# Patient Record
Sex: MALE | Race: WHITE | NOT HISPANIC OR LATINO | Employment: FULL TIME | URBAN - METROPOLITAN AREA
[De-identification: names, ages, dates, MRNs, and addresses within clinical notes are randomized per-mention and may not be internally consistent; named-entity substitution may affect disease eponyms.]

---

## 2023-12-28 ENCOUNTER — TELEPHONE (OUTPATIENT)
Dept: PRIMARY CARE | Facility: CLINIC | Age: 63
End: 2023-12-28
Payer: COMMERCIAL

## 2023-12-28 NOTE — TELEPHONE ENCOUNTER
Iris  [facility]  [phone]    Need a verbal for Dr to follow   Plan of care orders

## 2024-01-03 ENCOUNTER — OFFICE VISIT (OUTPATIENT)
Dept: PRIMARY CARE | Facility: CLINIC | Age: 64
End: 2024-01-03
Payer: COMMERCIAL

## 2024-01-03 VITALS
DIASTOLIC BLOOD PRESSURE: 72 MMHG | HEIGHT: 66 IN | OXYGEN SATURATION: 96 % | SYSTOLIC BLOOD PRESSURE: 118 MMHG | HEART RATE: 112 BPM | BODY MASS INDEX: 22.92 KG/M2 | WEIGHT: 142.6 LBS

## 2024-01-03 DIAGNOSIS — E78.2 MIXED HYPERLIPIDEMIA: ICD-10-CM

## 2024-01-03 DIAGNOSIS — F10.11 HISTORY OF ETOH ABUSE: ICD-10-CM

## 2024-01-03 DIAGNOSIS — Z76.89 ENCOUNTER FOR SUPPORT AND COORDINATION OF TRANSITION OF CARE: Primary | ICD-10-CM

## 2024-01-03 DIAGNOSIS — I10 PRIMARY HYPERTENSION: ICD-10-CM

## 2024-01-03 DIAGNOSIS — J44.1 CHRONIC OBSTRUCTIVE PULMONARY DISEASE WITH ACUTE EXACERBATION (MULTI): ICD-10-CM

## 2024-01-03 PROBLEM — F17.200 CURRENT EVERY DAY SMOKER: Status: ACTIVE | Noted: 2024-01-03

## 2024-01-03 PROCEDURE — 3074F SYST BP LT 130 MM HG: CPT | Performed by: EMERGENCY MEDICINE

## 2024-01-03 PROCEDURE — 3078F DIAST BP <80 MM HG: CPT | Performed by: EMERGENCY MEDICINE

## 2024-01-03 PROCEDURE — 99496 TRANSJ CARE MGMT HIGH F2F 7D: CPT | Performed by: EMERGENCY MEDICINE

## 2024-01-03 NOTE — PROGRESS NOTES
Subjective   Patient ID: Cuco Arshad is a 63 y.o. male who presents for Hospital Follow-up.    Assessment/Plan   Problem List Items Addressed This Visit       COPD (chronic obstructive pulmonary disease) (CMS/Formerly [facility])    History of ETOH abuse    Hyperlipidemia    Hypertension     Other Visit Diagnoses       Encounter for support and coordination of transition of care    -  Primary          COPD- continue spiriva and albuterol prn. Counseled to follow up with pulmonary.     CAD/PVD- continue atorvastatin    Hypertension- stable, continue lisinopril 10mg     Anxiety and depression- doing well on sertraline 100mg     Follow up in 3 months or sooner as needed     Source of history: Nurse, Medical personnel, Medical record, Patient.  History limitation: None.    HPI  63 y.o. male here for transition of care visit     Patient was discharged from [facility] on 12/29/2023 and there was interactive contact by patient/family or facility staff on behalf of patient with me/office within 2 working days regarding patient's transition    Patient was recently admitted to the hospital.  Patient's history regarding the reason for hospital admission and the course in the hospital was reviewed with patinet and/or family.  Patient's medical records including lab work, radiology and other medical notes were reviewed.  His medication list prior to admission and discharge medication list are compared and updated.    Patient encounter was done face-to-face    Patient is unable to give detailed history and therefore history is obtained from the chart    History from hospitalization-   Admitted for COPD exacerbation, acute hypoxic respiratory failure initially requiring intubation (extubated December 20), and ileus (resolved without intervention). History of alcohol abuse and chronic smoking. Additional problems include hypertension, CAD, and PVD. Pulmonary and ID consulted. Patient stabilized and discharged on steroids. Will  "get outpatient PFTs.     Since discharge reports has been breathing well. Using spiriva and albuterol as needed.      No Known Allergies    No current outpatient medications on file.     No current facility-administered medications for this visit.       Objective   Visit Vitals  /72   Pulse (!) 112   Ht 1.676 m (5' 6\")   Wt 64.7 kg (142 lb 9.6 oz)   SpO2 96%   BMI 23.02 kg/m²   BSA 1.74 m²     Physical Exam  Vital signs as per nursing/MA documentation   General appearance: Alert and in no acute distress  HEENT: Normal Inspection   Neck: Normal Inspection   Respiratory: No respiratory distress Lungs are clear   Cardiovascular: Heart rate normal. No gallop  Back: Normal Inspection   Skin inspection: Warm   Musculoskeletal: No deformities   Neuro: Limited exam. Baseline    Review of Systems  Comprehensive review of systems as allowed by patient condition and nursing input is negative    No visits with results within 4 Month(s) from this visit.   Latest known visit with results is:   No results found for any previous visit.       Radiology: Reviewed imaging in powerchart.  No results found.    No family history on file.  Social History     Socioeconomic History    Marital status:      Spouse name: Not on file    Number of children: Not on file    Years of education: Not on file    Highest education level: Not on file   Occupational History    Not on file   Tobacco Use    Smoking status: Not on file    Smokeless tobacco: Not on file   Substance and Sexual Activity    Alcohol use: Not on file    Drug use: Not on file    Sexual activity: Not on file   Other Topics Concern    Not on file   Social History Narrative    Not on file     Social Determinants of Health     Financial Resource Strain: Not on file   Food Insecurity: Not on file   Transportation Needs: Not on file   Physical Activity: Not on file   Stress: Not on file   Social Connections: Not on file   Intimate Partner Violence: Not on file   Housing " Stability: Not on file     No past medical history on file.  No past surgical history on file.    Scribe Attestation  By signing my name below, I, Mane Meléndez   attest that this documentation has been prepared under the direction and in the presence of Josias Ramirez MD.

## 2025-03-17 ENCOUNTER — NURSING HOME VISIT (OUTPATIENT)
Dept: POST ACUTE CARE | Facility: EXTERNAL LOCATION | Age: 65
End: 2025-03-17
Payer: MEDICAID

## 2025-03-17 DIAGNOSIS — J44.1 COPD EXACERBATION (MULTI): ICD-10-CM

## 2025-03-17 DIAGNOSIS — J96.01 ACUTE HYPOXIC RESPIRATORY FAILURE: Primary | ICD-10-CM

## 2025-03-17 DIAGNOSIS — E78.2 MIXED HYPERLIPIDEMIA: ICD-10-CM

## 2025-03-17 PROCEDURE — 99305 1ST NF CARE MODERATE MDM 35: CPT | Performed by: STUDENT IN AN ORGANIZED HEALTH CARE EDUCATION/TRAINING PROGRAM

## 2025-06-20 NOTE — PROGRESS NOTES
Date of Service: 3/17/25      HPI/Subjective  64M with PMH of COPD, MDD, HLD was admitted to OSH for COPD exacerbation requiring ICU admission for BIPAP. Once medically stabilized he is discharged to [facility] for skilled rehab. Currently requiring 4LNCO2 (new since hospital). Denies any new/worsening SOB/HELM. No fevers, chills, CP reported. Tolerating therapy POC appropriately. POC discussed/concerns addressed.     Denies fevers, chills, weight loss, lightheadedness, dizziness, vision changes, sore throat, runny nose, CP, palpitations, n/v/d, abd pain, black/bloody stools, mood disturbance, or new numbness/weakness/tingling in arms/legs/face.        Other Medical, Surgical, Family, Social Hx, Allergies per chart in PCC.   Medication list reviewed. Please see PCC.     Objective:   Physical Exam     Vital signs reviewed. Please see chart in PCC.     General: NAD. NCAT. Alert, awake  HEENT: PERRLA. EOMI. MMM. Nares patent bl.  Cardiovascular: RRR. S1/S2 wnl.   Respiratory: BL scattered rhonchi. No acute respiratory distress on 4LNCO2  GI: Soft, NT abdomen. BS present x 4.   MSK: Generalized weakness   Extremities: No major edema.   Skin: No visible rashes or bruises.   Neuro: Cranial Nerves grossly intact. Motor/sensory wnl.   Psych: Mood wnl.          REVIEW OF SYSTEMS   ROS reviewed within HPI and is otherwise negative       Assessment and Plan  Encounter Diagnoses   Name Primary?    Acute hypoxic respiratory failure (Multi) Yes    COPD exacerbation (Multi)     Mixed hyperlipidemia        -Continue supplemental O2 to maintain SpO2 > 88%. Wean as tolerated  -Finish course of prednisone  -Continue incruse ellipta and symbicort  -Monitor for recurrent SOB/HELM.   -Continue statin.   -Pre-Admission hospital notes reviewed  -Pertinent radiology, if any, reviewed   -Current rehab plan reviewed; continue pending any major changes  -Current medication therapy reviewed. Continue and monitor for adverse effects.  -Monitor  vitals  -Monitor labs  -Continue home medications for chronic medical conditions.   -PT/OT as tolerated  -Low carb, Low sodium, Low fat diet advised         Charting was completed using voice recognition technology and may include unintended errors.    Silvestre Reddy MD